# Patient Record
Sex: FEMALE | Race: WHITE
[De-identification: names, ages, dates, MRNs, and addresses within clinical notes are randomized per-mention and may not be internally consistent; named-entity substitution may affect disease eponyms.]

---

## 2019-10-13 ENCOUNTER — HOSPITAL ENCOUNTER (OUTPATIENT)
Dept: HOSPITAL 97 - ER | Age: 54
Setting detail: OBSERVATION
Discharge: HOME | End: 2019-10-13
Attending: SURGERY | Admitting: SURGERY
Payer: COMMERCIAL

## 2019-10-13 VITALS — TEMPERATURE: 98.2 F | SYSTOLIC BLOOD PRESSURE: 107 MMHG | DIASTOLIC BLOOD PRESSURE: 55 MMHG

## 2019-10-13 VITALS — OXYGEN SATURATION: 100 %

## 2019-10-13 DIAGNOSIS — K35.890: Primary | ICD-10-CM

## 2019-10-13 DIAGNOSIS — E66.01: ICD-10-CM

## 2019-10-13 LAB
ALBUMIN SERPL BCP-MCNC: 3.8 G/DL (ref 3.4–5)
ALP SERPL-CCNC: 115 U/L (ref 45–117)
ALT SERPL W P-5'-P-CCNC: 39 U/L (ref 12–78)
AST SERPL W P-5'-P-CCNC: 29 U/L (ref 15–37)
BUN BLD-MCNC: 24 MG/DL (ref 7–18)
GLUCOSE SERPLBLD-MCNC: 131 MG/DL (ref 74–106)
HCT VFR BLD CALC: 37.9 % (ref 36–45)
INR BLD: 0.95
LIPASE SERPL-CCNC: 79 U/L (ref 73–393)
LYMPHOCYTES # SPEC AUTO: 1.4 K/UL (ref 0.7–4.9)
MAGNESIUM SERPL-MCNC: 2 MG/DL (ref 1.8–2.4)
NT-PROBNP SERPL-MCNC: 30 PG/ML (ref ?–125)
PMV BLD: 8 FL (ref 7.6–11.3)
POTASSIUM SERPL-SCNC: 4.2 MMOL/L (ref 3.5–5.1)
RBC # BLD: 4.09 M/UL (ref 3.86–4.86)
TROPONIN (EMERG DEPT USE ONLY): < 0.02 NG/ML (ref 0–0.04)
UA COMPLETE W REFLEX CULTURE PNL UR: (no result)

## 2019-10-13 PROCEDURE — 36415 COLL VENOUS BLD VENIPUNCTURE: CPT

## 2019-10-13 PROCEDURE — 88304 TISSUE EXAM BY PATHOLOGIST: CPT

## 2019-10-13 PROCEDURE — 80320 DRUG SCREEN QUANTALCOHOLS: CPT

## 2019-10-13 PROCEDURE — 71045 X-RAY EXAM CHEST 1 VIEW: CPT

## 2019-10-13 PROCEDURE — 83880 ASSAY OF NATRIURETIC PEPTIDE: CPT

## 2019-10-13 PROCEDURE — 96365 THER/PROPH/DIAG IV INF INIT: CPT

## 2019-10-13 PROCEDURE — 96361 HYDRATE IV INFUSION ADD-ON: CPT

## 2019-10-13 PROCEDURE — 80048 BASIC METABOLIC PNL TOTAL CA: CPT

## 2019-10-13 PROCEDURE — 81015 MICROSCOPIC EXAM OF URINE: CPT

## 2019-10-13 PROCEDURE — 80076 HEPATIC FUNCTION PANEL: CPT

## 2019-10-13 PROCEDURE — 0DTJ4ZZ RESECTION OF APPENDIX, PERCUTANEOUS ENDOSCOPIC APPROACH: ICD-10-PCS

## 2019-10-13 PROCEDURE — 93005 ELECTROCARDIOGRAM TRACING: CPT

## 2019-10-13 PROCEDURE — 87086 URINE CULTURE/COLONY COUNT: CPT

## 2019-10-13 PROCEDURE — 74177 CT ABD & PELVIS W/CONTRAST: CPT

## 2019-10-13 PROCEDURE — 85025 COMPLETE CBC W/AUTO DIFF WBC: CPT

## 2019-10-13 PROCEDURE — 87088 URINE BACTERIA CULTURE: CPT

## 2019-10-13 PROCEDURE — 81025 URINE PREGNANCY TEST: CPT

## 2019-10-13 PROCEDURE — 81003 URINALYSIS AUTO W/O SCOPE: CPT

## 2019-10-13 PROCEDURE — 83735 ASSAY OF MAGNESIUM: CPT

## 2019-10-13 PROCEDURE — 85610 PROTHROMBIN TIME: CPT

## 2019-10-13 PROCEDURE — 96375 TX/PRO/DX INJ NEW DRUG ADDON: CPT

## 2019-10-13 PROCEDURE — 83690 ASSAY OF LIPASE: CPT

## 2019-10-13 PROCEDURE — 99285 EMERGENCY DEPT VISIT HI MDM: CPT

## 2019-10-13 PROCEDURE — 84484 ASSAY OF TROPONIN QUANT: CPT

## 2019-10-13 NOTE — EDPHYS
Physician Documentation                                                                           

 [facility]                                                                 

Name: Chata Noriega                                                                                 

Age: 54 yrs                                                                                       

Sex: Female                                                                                       

: 1965                                                                                   

MRN: [id number]                                                                                   

Arrival Date: 10/13/2019                                                                          

Time: 08:32                                                                                       

Account#: [id number]                                                                            

Bed 15                                                                                            

Private MD: Unknown, Unknown                                                                      

ED Physician Armaan Duval                                                                       

HPI:                                                                                              

10/13                                                                                             

09:22 This 54 yrs old  Female presents to ER via Ambulatory with complaints of Chest kdr 

      Pain, Abdominal Pain, Back Pain.                                                            

09:22 The patient or guardian reports chest pain that is located primarily in the epigastric  kdr 

      area, anterior chest wall, left, chest diffusely. Onset: suddenly, this morning, at         

      03:00. The pain radiates to the left scapula, back. Associated signs and symptoms:          

      Pertinent positives: abdominal pain, nausea, Pertinent negatives: recent travel,            

      shortness of breath, syncope, vomiting. The chest pain is described as aching, burning,     

      causing indigestion, a pressure. Duration: The patient or guardian reports a single         

      episode, that is still ongoing, but improving. Modifying factors: The symptoms are          

      alleviated by nothing. the symptoms are aggravated by nothing. Severity of pain: At its     

      worst the pain was moderate severe just prior to arrival, in the emergency department       

      the pain is unchanged. The patient has not experienced similar symptoms in the past.        

      The patient has not recently seen a physician. States that last evening in the hour or      

      so prior to midnight she had several alcoholic beverages and then awoke at 03:00 with       

      severe abdominal and chest pain.                                                            

                                                                                                  

OB/GYN:                                                                                           

08:56 LMP 2019                                                                              jl7 

                                                                                                  

Historical:                                                                                       

- Allergies:                                                                                      

08:56 No Known Allergies;                                                                     jl7 

- Home Meds:                                                                                      

08:56 None [Active];                                                                          jl7 

- PMHx:                                                                                           

08:56 None;                                                                                   jl7 

- PSHx:                                                                                           

08:56 Tubal ligation;                                                                         jl7 

                                                                                                  

- Immunization history:: Adult Immunizations unknown.                                             

- Social history:: Smoking status: Patient/guardian denies using tobacco, Patient uses            

  alcohol, occasionally.                                                                          

- Ebola Screening: : No symptoms or risks identified at this time.                                

                                                                                                  

                                                                                                  

ROS:                                                                                              

09:22 Constitutional: Negative for fever, chills, and weight loss, Eyes: Negative for injury, kdr 

      pain, redness, and discharge, ENT: Negative for injury, pain, and discharge, Neck:          

      Negative for injury, pain, and swelling, Respiratory: Negative for shortness of breath,     

      cough, wheezing, and pleuritic chest pain, Back: Negative for injury and pain, :          

      Negative for injury, bleeding, discharge, and swelling, MS/Extremity: Negative for          

      injury and deformity, Skin: Negative for injury, rash, and discoloration, Neuro:            

      Negative for headache, weakness, numbness, tingling, and seizure activity. Psych:           

      Negative for depression, anxiety, suicide ideation, homicidal ideation, and                 

      hallucinations, Allergy/Immunology: Negative for hives, rash, and allergies, Endocrine:     

      Negative for neck swelling, polydipsia, polyuria, polyphagia, and marked weight             

      changes, Hematologic/Lymphatic: Negative for swollen nodes, abnormal bleeding, and          

      unusual bruising.                                                                           

09:22 Cardiovascular: Positive for chest pain, of the xyphoid area, mid-sternal area, right       

      breast and left breast, Negative for edema, orthopnea, palpitations, paroxysmal             

      nocturnal dyspnea.                                                                          

09:22 Abdomen/GI: Positive for abdominal pain, abdominal cramps, Negative for abdominal           

      distension, dysphagia, hematemesis, black/tarry stool, rectal pain, rectal bleeding,        

      bowel incontinence, flatulence.                                                             

                                                                                                  

Exam:                                                                                             

09:22 Constitutional:  This is a well developed, well nourished patient who is awake, alert,  kdr 

      and in no acute distress. Head/Face:  Normocephalic, atraumatic. Eyes:  Pupils equal        

      round and reactive to light, extra-ocular motions intact.  Lids and lashes normal.          

      Conjunctiva and sclera are non-icteric and not injected.  Cornea within normal limits.      

      Periorbital areas with no swelling, redness, or edema. Neck:  Trachea midline, no           

      thyromegaly or masses palpated, and no cervical lymphadenopathy.  Supple, full range of     

      motion without nuchal rigidity, or vertebral point tenderness.  No Meningismus.             

      Chest/axilla:  Normal chest wall appearance and motion.  Nontender with no deformity.       

      No lesions are appreciated. Cardiovascular:  Regular rate and rhythm with a normal S1       

      and S2.  No gallops, murmurs, or rubs.  Normal PMI, no JVD.  No pulse deficits.             

      Respiratory:  Lungs have equal breath sounds bilaterally, clear to auscultation and         

      percussion.  No rales, rhonchi or wheezes noted.  No increased work of breathing, no        

      retractions or nasal flaring. Back:  No spinal tenderness.  No costovertebral               

      tenderness.  Full range of motion. Skin:  Warm, dry with normal turgor.  Normal color       

      with no rashes, no lesions, and no evidence of cellulitis. MS/ Extremity:  Pulses           

      equal, no cyanosis.  Neurovascular intact.  Full, normal range of motion. Neuro:  Awake     

      and alert, GCS 15, oriented to person, place, time, and situation.  Cranial nerves          

      II-XII grossly intact.  Motor strength 5/5 in all extremities.  Sensory grossly intact.     

       Cerebellar exam normal.  Normal gait. Psych:  Awake, alert, with orientation to            

      person, place and time.  Behavior, mood, and affect are within normal limits.               

09:22 Abdomen/GI: Inspection: abdomen appears normal, Bowel sounds: active, diminished, in        

      all quadrants, Palpation: soft, mild abdominal tenderness, in the abdomen diffusely,        

      mass, is not appreciated, rebound tenderness, is not appreciated.                           

09:22 ECG was reviewed by the Attending Physician.                                            kdr 

                                                                                                  

Vital Signs:                                                                                      

08:56  / 97; Pulse 91; Resp 16 S; Temp 98.3(O); Pulse Ox 100% on R/A; Weight 99.79 kg   jl7 

      (R); Height 5 ft. 2 in. (157.48 cm) (R); Pain 9/10;                                         

09:30  / 58; Pulse 83; Resp 16 S; Pulse Ox 100% on R/A; Pain 9/10;                      jl7 

10:45  / 65; Pulse 81; Resp 16 S; Pulse Ox 100% on R/A;                                 jl7 

13:00  / 76; Pulse 77; Resp 16 S; Pulse Ox 100% on R/A;                                 jl7 

08:56 Body Mass Index 40.24 (99.79 kg, 157.48 cm)                                             jl7 

                                                                                                  

MDM:                                                                                              

10:43 Patient medically screened.                                                             kdr 

10:43 Data reviewed: vital signs, nurses notes, lab test result(s), radiologic studies.       kdr 

      Counseling: I had a detailed discussion with the patient and/or guardian regarding: the     

      historical points, exam findings, and any diagnostic results supporting the                 

      discharge/admit diagnosis, lab results, radiology results, the need for further work-up     

      and treatment in the hospital. Physician consultation: Jamison Covarrubias MD regarding             

      admission, patient's condition, need to evaluate the patient as soon as possible.           

                                                                                                  

10/13                                                                                             

08:46 Order name: Urine Culture                                                               eb  

10/13                                                                                             

08:46 Order name: Urine Microscopic Only; Complete Time: 10:16                                eb  

10/13                                                                                             

08:47 Order name: Urine Culture                                                               EDMS

10/13                                                                                             

08:47 Order name: Urine Dipstick--Ancillary (enter results); Complete Time: 10:16             eb  

10/13                                                                                             

09:04 Order name: Basic Metabolic Panel                                                       kdr 

10/13                                                                                             

09:04 Order name: CBC with Diff                                                               kdr 

10/13                                                                                             

09:04 Order name: LFT's                                                                       kdr 

10/13                                                                                             

09:04 Order name: Magnesium                                                                   kdr 

10/13                                                                                             

09:04 Order name: NT PRO-BNP; Complete Time: 10:16                                            kdr 

10/13                                                                                             

09:04 Order name: PT-INR; Complete Time: 10:16                                                kdr 

10/13                                                                                             

09:04 Order name: Troponin (emerg Dept Use Only); Complete Time: 10:16                        kdr 

10/13                                                                                             

09:04 Order name: Creatinine for Radiology; Complete Time: 10:16                              kdr 

10/13                                                                                             

09:04 Order name: Lipase; Complete Time: 10:16                                                kdr 

10/13                                                                                             

09:04 Order name: ETOH Level; Complete Time: 10:16                                            [facility] 

10/13                                                                                             

09:04 Order name: XRAY Chest (1 view); Complete Time: 16:42                                   kdr 

10/13                                                                                             

09:04 Order name: Basic Metabolic Panel; Complete Time: 10:16                                 EDMS

10/13                                                                                             

09:04 Order name: CBC with Automated Diff; Complete Time: 10:16                               EDMS

10/13                                                                                             

09:04 Order name: Liver (Hepatic) Function; Complete Time: 10:16                              EDMS

10/13                                                                                             

09:04 Order name: Magnesium; Complete Time: 10:16                                             EDMS

10/13                                                                                             

09:11 Order name: CT Abd/Pelvis - IV Contrast Only; Complete Time: 16:42                      kdr 

10/13                                                                                             

12:16 Order name: Urine Pregnancy--Ancillary (enter results); Complete Time: 16:42            iw  

10/13                                                                                             

13:40 Order name: Basic Metabolic Panel                                                       EDMS

10/13                                                                                             

13:40 Order name: Basic Metabolic Panel                                                       EDMS

10/13                                                                                             

13:40 Order name: CBC with Automated Diff                                                     EDMS

10/13                                                                                             

13:40 Order name: CBC with Automated Diff                                                     EDMS

10/13                                                                                             

13:40 Order name: Lipase                                                                      EDMS

10/13                                                                                             

13:40 Order name: Lipase                                                                      EDMS

10/13                                                                                             

13:40 Order name: Liver (Hepatic) Function                                                    EDMS

10/13                                                                                             

13:40 Order name: Liver (Hepatic) Function                                                    EDMS

10/13                                                                                             

09:04 Order name: Cardiac monitoring; Complete Time: :                                    kdr 

10/13                                                                                             

09:04 Order name: EKG - Nurse/Tech; Complete Time: :                                      kdr 

10/13                                                                                             

09:04 Order name: IV Saline Lock; Complete Time: :                                        kdr 

10/13                                                                                             

09:04 Order name: Labs collected and sent; Complete Time: :                               kdr 

10/13                                                                                             

09:04 Order name: O2 Per Protocol; Complete Time: :                                       kdr 

10/13                                                                                             

09:04 Order name: O2 Sat Monitoring; Complete Time: :                                     kdr 

10/13                                                                                             

13:40 Order name: NPO                                                                         EDMS

                                                                                                  

EC: Rate is 82 beats/min. Rhythm is regular, Normal Sinus Rhythm with No ectopy. QRS Axis   kdr 

      is Normal. OH interval is normal. QRS interval is normal. QT interval is normal. No Q       

      waves. T waves are Normal. No ST changes noted. Clinical impression: Normal ECG and NSR     

      w/ Non-specific ST/T Changes.                                                               

                                                                                                  

Administered Medications:                                                                         

09:12 Drug: NS 0.9% 1000 ml Route: IV; Rate: 1 bolus; Site: right antecubital;                jl7 

10:30 Follow up: Response: No adverse reaction; IV Status: Completed infusion; IV Intake:     jl7 

      1000ml                                                                                      

09:13 Drug: Pepcid 20 mg Route: IVP; Site: right antecubital;                                 jl7 

09:59 Follow up: Response: No adverse reaction; Marked relief of symptoms                     jl7 

09:14 Drug: Zofran 4 mg Route: IVP; Site: right antecubital;                                  jl7 

09:45 Follow up: Response: No adverse reaction                                                jl7 

09:16 Drug: morphine 2 mg Route: IVP; Site: right antecubital;                                jl7 

09:45 Follow up: Response: No adverse reaction; Pain is decreased                             jl7 

09:55 Drug: morphine 4 mg Route: IVP; Site: right antecubital;                                jl7 

10:10 Follow up: Response: No adverse reaction; Pain is unchanged, physician notified         jl7 

10:32 Drug: Rocephin 1 grams Route: IV; Rate: calculated rate; Site: right antecubital;       jl7 

10:35 Follow up: Response: No adverse reaction; IV Status: Completed infusion                 jl7 

10:43 Drug: Zosyn 4.5 grams Route: IVPB; Infused Over: 60 mins; Site: right antecubital;      jl7 

11:43 Follow up: Response: No adverse reaction; IV Status: Completed infusion                 jl7 

11:46 Drug: fentaNYL (PF) 50 mcg Route: IVP; Site: right antecubital;                         jl7 

12:00 Follow up: Response: No adverse reaction; Pain is decreased                             jl7 

                                                                                                  

                                                                                                  

Disposition:                                                                                      

10/13/19 13:31 Hospitalization ordered by Jamison Covarrubias for Observation. Preliminary diagnosis     

  is Acute appendicitis.                                                                          

- Bed requested for Operating Room.                                                               

- Status is Observation.                                                                      jl7 

- Condition is Fair.                                                                              

- Problem is new.                                                                                 

- Symptoms are unchanged.                                                                         

UTI on Admission? No                                                                              

                                                                                                  

                                                                                                  

                                                                                                  

Signatures:                                                                                       

Dispatcher [employer]                           EDMS                                                 

Armaan Duval MD MD   kdr                                                  

Vinnie Marx RN                        RN   jl7                                                  

                                                                                                  

Corrections: (The following items were deleted from the chart)                                    

10:52 10:43 Hospitalization Ordered by Jamison Covarrubias MD for Observation. Preliminary diagnosis  kdr 

      is Acute appendicitis; Chronic right hydronephrosis. Bed requested for                      

      Telemetry/MedSurg (observation). Status is Observation. Condition is Fair. Problem is       

      new. Symptoms are unchanged. UTI on Admission? No. kdr                                      

12:26 10:52 10/13/2019 10:43 Hospitalization Ordered by Jamison Covarrubias MD for Observation.       kdr 

      Preliminary diagnosis is Acute appendicitis; Chronic right hydronephrosis. Bed              

      requested for Operating Room. Status is Observation. Condition is Fair. Problem is new.     

      Symptoms are unchanged. UTI on Admission? No. kdr                                           

13:30 12:27 10/13/2019 12:27 Transfer ordered to [facility]. Diagnosis is kdr 

      Acute appendicitis. Reason for transfer: Higher level of care. Accepting physician is       

      Dr. Castillo ( Service). Condition is Fair. Problem is new. Symptoms have improved. kdr      

13:55 13:31 Hospitalization Ordered by Jamison Covarrubias MD for Observation. Preliminary diagnosis  jl7 

      is Acute appendicitis. Bed requested for Operating Room. Status is Observation.             

      Condition is Fair. Problem is new. Symptoms are unchanged. UTI on Admission? No. kdr        

                                                                                                  

**************************************************************************************************

## 2019-10-13 NOTE — RAD REPORT
EXAM DESCRIPTION:  RAD - Chest Single View - 10/13/2019 9:24 am

 

CLINICAL HISTORY:  Chest pain

 

COMPARISON:  None.

 

TECHNIQUE:  AP portable chest image was obtained 0919 hours .

 

FINDINGS:  Lungs are clear. Heart and vasculature are normal. No measurable pleural effusion and no p
neumothorax. No acute bony abnormality seen. No acute aortic findings suspected.

 

IMPRESSION:  No acute cardiopulmonary process.

## 2019-10-13 NOTE — RAD REPORT
EXAM DESCRIPTION:  CT - Abdomen   Pelvis W Contrast - 10/13/2019 10:12 am

 

CLINICAL HISTORY:  ABD PAIN

 

COMPARISON:  None.

 

TECHNIQUE:  Biphasic, helical CT imaging of the abdomen and pelvis was performed following 100 ml non
-ionic IV contrast. No oral contrast given.

 

All CT scans are performed using dose optimization technique as appropriate and may include automated
 exposure control or mA/KV adjustment according to patient size.

 

FINDINGS:  No suspicious findings in the lung bases.

 

The liver, spleen, and pancreas show no suspicious findings. Gallbladder and biliary tree are also wi
thout suspicious finding.

 

Patient has severe hydronephrosis of the pelvis and calices of the right kidney without hydroureter. 
No dilatation of the left collecting system. Cortical thinning is noted on the right when compared to
 the left with a more lobulated contour to the kidney. Renal function is not clearly asymmetric. Nono
bstructing calculi seen in the upper pole of the right kidney. No pyelonephritis or acute parenchymal
 process. No bladder abnormalities. No adrenal abnormalities.

 

No gastric dilatation or gastric wall thickening. No small bowel abnormality. The appendix is abnorma
l at 13 mm. Periappendiceal inflammatory stranding is present. No fecalith seen. No mass is identifie
d. Remainder the colon is unremarkable. The  no free air or pneumatosis. Small quantity of fluid is p
resent in the cul de sac and right adnexa believed to be reactive fluid from the appendicitis.  No ma
ss or bulky lymphadenopathy. Small fat only umbilical hernia is present.

 

Tubal ligation clips are present. No left ovarian abnormality. A 2.5 centimeter low-density mass is p
resent right lower uterine segment region probably a small fibroid. This is potentially a large nabot
hian cyst. No primary right ovarian process suspected.

 

Disc and bony degenerative changes are present. No acute vascular finding.

 

 

IMPRESSION:  Acute appendicitis findings are present. The appendix is in classic right lower quadrant
 location.

 

Periappendiceal inflammatory stranding is present. No abscess, free air or other complicating factor.


 

Severe hydronephrosis of the pelvis and calices of the right kidney without clear delay or asymmetry 
of function. 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

 

Additional nonacute findings detailed in the body of the report.

## 2019-10-13 NOTE — ER
Nurse's Notes                                                                                     

 [facility]                                                                 

Name: Chata Noriega                                                                                 

Age: 54 yrs                                                                                       

Sex: Female                                                                                       

: 1965                                                                                   

MRN: [id number]                                                                                   

Arrival Date: 10/13/2019                                                                          

Time: 08:32                                                                                       

Account#: [id number]                                                                            

Bed 15                                                                                            

Private MD: Unknown, Unknown                                                                      

Diagnosis: Acute appendicitis                                                                     

                                                                                                  

Presentation:                                                                                     

10/13                                                                                             

08:53 Presenting complaint: Patient states: Had a couple drinks last night, home by 1230. At  jl7 

      about 0300 my stomach was burning, vomited once and then my chest started hurting,          

      radiating to left arm and back. It also feels like I need to pee when I stand up but I      

      don't. Transition of care: patient was not received from another setting of care. Onset     

      of symptoms was 2019 at 03:00. Risk Assessment: Do you want to hurt             

      yourself or someone else? Patient reports no desire to harm self or others. Initial         

      Sepsis Screen: Does the patient meet any 2 criteria? No. Patient's initial sepsis           

      screen is negative. Does the patient have a suspected source of infection? No.              

      Patient's initial sepsis screen is negative. Care prior to arrival: None.                   

08:53 Method Of Arrival: Ambulatory                                                           [facility] 

08:53 Acuity: CARIDAD 3                                                                           jl7 

                                                                                                  

OB/GYN:                                                                                           

08:56 LMP 2019                                                                              [facility] 

                                                                                                  

Historical:                                                                                       

- Allergies:                                                                                      

08:56 No Known Allergies;                                                                     jl7 

- Home Meds:                                                                                      

08:56 None [Active];                                                                          jl7 

- PMHx:                                                                                           

08:56 None;                                                                                   jl7 

- PSHx:                                                                                           

08:56 Tubal ligation;                                                                         jl7 

                                                                                                  

- Immunization history:: Adult Immunizations unknown.                                             

- Social history:: Smoking status: Patient/guardian denies using tobacco, Patient uses            

  alcohol, occasionally.                                                                          

- Ebola Screening: : No symptoms or risks identified at this time.                                

                                                                                                  

                                                                                                  

Screenin:10 Abuse screen: Denies threats or abuse. Nutritional screening: No deficits noted.        em  

      Tuberculosis screening: No symptoms or risk factors identified. Fall Risk None              

      identified.                                                                                 

                                                                                                  

Assessment:                                                                                       

08:35 General: Appears in no apparent distress. uncomfortable, Behavior is calm, cooperative, jl7 

      appropriate for age. Pain: Complains of pain in anterior aspect of left upper chest         

      Pain radiates to back and left arm Pain currently is 9 out of 10 on a pain scale.           

      Quality of pain is described as squeezing, Pain began 0300 Is continuous. Neuro: Level      

      of Consciousness is awake, alert, obeys commands, Oriented to person, place, time,          

      situation. Cardiovascular: Patient's skin is warm and dry. Rhythm is sinus rhythm.          

      Respiratory: Airway is patent Respiratory effort is even, unlabored, Respiratory            

      pattern is regular, symmetrical. GI: Abdomen is round non-distended, Reports epigastric     

      pain, nausea, vomiting. : Reports urinary frequency. Derm: Skin is pink, warm \T\ dry.    

09:45 Reassessment: Pt reports decreased chest pain and decreased epigastric burning. Reports jl7 

      RLQ pain, feels like pressure, rated 9/10, pt guarding, reports last BM 2 days ago. ERP     

      notified, see MAR for orders.                                                               

                                                                                                  

Vital Signs:                                                                                      

08:56  / 97; Pulse 91; Resp 16 S; Temp 98.3(O); Pulse Ox 100% on R/A; Weight 99.79 kg   jl7 

      (R); Height 5 ft. 2 in. (157.48 cm) (R); Pain 9/10;                                         

09:30  / 58; Pulse 83; Resp 16 S; Pulse Ox 100% on R/A; Pain 9/10;                      jl7 

10:45  / 65; Pulse 81; Resp 16 S; Pulse Ox 100% on R/A;                                 jl7 

13:00  / 76; Pulse 77; Resp 16 S; Pulse Ox 100% on R/A;                                 jl7 

08:56 Body Mass Index 40.24 (99.79 kg, 157.48 cm)                                             jl7 

                                                                                                  

ED Course:                                                                                        

08:32 Patient arrived in ED.                                                                  ag5 

08:33 Unknown, Unknown is Private Physician.                                                  ag5 

08:33 Vinnie Marx RN is Primary Nurse.                                                      jl7 

08:35 Armaan Duval MD is Attending Physician.                                              kdr 

08:55 Triage completed.                                                                       jl7 

08:56 Arm band placed on right wrist.                                                         jl7 

09:07 Urine collected: clean catch specimen, cloudy.                                          ms  

09:10 Patient has correct armband on for positive identification. Placed in gown. Bed in low  em  

      position. Call light in reach. Side rails up X2. Cardiac monitor on. Pulse ox on. NIBP      

      on.                                                                                         

09:10 Initial lab(s) drawn, by me, sent to lab. Inserted saline lock: 22 gauge in right       em  

      antecubital area, using aseptic technique. Blood collected.                                 

09:10 Patient maintains SpO2 saturation greater than 95% on room air.                         em  

09:18 Radiology exam delayed due to lab results not completed at this time. (BUN/Creatinine). kw1 

09:24 XRAY Chest (1 view) In Process Unspecified.                                             EDMS

10:11 CT Abd/Pelvis - IV Contrast Only In Process Unspecified.                                EDMS

10:16 CT completed. Patient tolerated procedure well. Patient moved back from CT.             ka  

10:41 Jamison Covarrubias MD is Hospitalizing Provider.                                             kdr 

12:06 initiated a transfer with Jennifer at the [facility] transfer center.                       eb  

12:15 connected Dr. Devi the hospitalist on call for [facility] with Dr. Duval for      

      patient transfer consultation.                                                              

13:31 Jamison Covarrubias MD is Hospitalizing Provider.                                             kdr 

13:54 No provider procedures requiring assistance completed. Patient admitted, IV remains in  jl7 

      place. intact, No redness/swelling at site.                                                 

                                                                                                  

Administered Medications:                                                                         

09:12 Drug: NS 0.9% 1000 ml Route: IV; Rate: 1 bolus; Site: right antecubital;                jl7 

10:30 Follow up: Response: No adverse reaction; IV Status: Completed infusion; IV Intake:     jl7 

      1000ml                                                                                      

09:13 Drug: Pepcid 20 mg Route: IVP; Site: right antecubital;                                 jl7 

09:59 Follow up: Response: No adverse reaction; Marked relief of symptoms                     jl7 

09:14 Drug: Zofran 4 mg Route: IVP; Site: right antecubital;                                  jl7 

09:45 Follow up: Response: No adverse reaction                                                jl7 

09:16 Drug: morphine 2 mg Route: IVP; Site: right antecubital;                                jl7 

09:45 Follow up: Response: No adverse reaction; Pain is decreased                             jl7 

09:55 Drug: morphine 4 mg Route: IVP; Site: right antecubital;                                jl7 

10:10 Follow up: Response: No adverse reaction; Pain is unchanged, physician notified         jl7 

10:32 Drug: Rocephin 1 grams Route: IV; Rate: calculated rate; Site: right antecubital;       jl7 

10:35 Follow up: Response: No adverse reaction; IV Status: Completed infusion                 jl7 

10:43 Drug: Zosyn 4.5 grams Route: IVPB; Infused Over: 60 mins; Site: right antecubital;      jl7 

11:43 Follow up: Response: No adverse reaction; IV Status: Completed infusion                 jl7 

11:46 Drug: fentaNYL (PF) 50 mcg Route: IVP; Site: right antecubital;                         jl7 

12:00 Follow up: Response: No adverse reaction; Pain is decreased                             jl7 

                                                                                                  

                                                                                                  

Intake:                                                                                           

10:30 IV: 1000ml; Total: 1000ml.                                                              jl7 

                                                                                                  

Outcome:                                                                                          

10:43 Decision to Hospitalize by Provider.                                                    kdr 

12:27 ER care complete, transfer ordered by MD.                                               kdr 

13:31 Decision to Hospitalize by Provider.                                                    kdr 

13:54 Admitted to OR accompanied by nurse, via stretcher, with chart.                         jl7 

13:54 Condition: stable                                                                           

13:54 Discharge instructions given to patient, Instructed on the need for admit, Demonstrated     

      understanding of instructions.                                                              

13:55 Patient left the ED.                                                                    jl7 

                                                                                                  

Signatures:                                                                                       

Dispatcher [employer]                           EDMS                                                 

Armaan Duval MD MD   [facility]                                                  

Sudheer Lewis, ROMÁNN                       LVN  Yolanda Del Rosario ms, Vinnie Almeida, RN                        RN   jl7                                                  

Carlie Powell1                                                  

Malia Candelario, Joan                                ag5                                                  

                                                                                                  

Corrections: (The following items were deleted from the chart)                                    

 09:35 General: Appears in no apparent distress. uncomfortable, Behavior is calm,        jl7 

      cooperative, appropriate for age, jl7                                                       

 09:35 Pain: Complains of pain in anterior aspect of left upper chest Pain radiates to   jl7 

      back and left arm Pain currently is 9 out of 10 on a pain scale. Quality of pain is         

      described as squeezing, Pain began 0300 Is continuous, jl7                                  

 09:35 Neuro: Level of Consciousness is awake, alert, obeys commands, Oriented to        jl7 

      person, place, time, situation, jl7                                                         

 09:35 Cardiovascular: Patient's skin is warm and dry. Rhythm is sinus rhythm jl7        jl7 

 09:35 Respiratory: Airway is patent Respiratory effort is even, unlabored, Respiratory  jl7 

      pattern is regular, symmetrical, jl7                                                        

 09:35 GI: Abdomen is round non-distended, Reports epigastric pain, nausea, vomiting, [id number] 

09:57 09:35 : Reports urinary frequency, jl7                                                jl7 

09:57 09:35 Derm: Skin is pink, warm \T\ dry. jl7                                               jl7

                                                                                                  

**************************************************************************************************

## 2019-10-13 NOTE — OP
Date of Procedure:  10/13/2019



Surgeon:  Louis Covarrubias MD, MD



Preoperative Diagnosis:  Acute nonperforated appendicitis.



Postoperative Diagnosis:  Acute nonperforated appendicitis.



Procedure Performed:  Laparoscopic appendectomy.



Anesthesia:  General endotracheal plus local.



Estimated Blood Loss:  Less than 10 mL.



Specimen:  Vermiform appendix.



Findings:  Acute nonperforated appendicitis.



Complications:  None.



Disposition:  Transferred to recovery room in good condition.



Procedure In Detail:  After informed consent was obtained, the patient was brought to the operating r
oom, prepped and draped in the usual sterile fashion.  After adequate anesthesia was achieved, an inf
raumbilical area was anesthetized with 0.5% Marcaine, sharply incised and a 5-mm trocar was introduce
d in the abdomen without evidence of complication.  Insufflation was obtained to 15 mmHg.  At this ti
me, there was no injury to vital structures upon entry into the abdomen.  Additional trocar site was 
chosen in the right lower quadrant.  This was similarly anesthetized and sharply incised.  A 5-mm tro
car was introduced in the abdomen without evidence of complication.  The umbilical trocar was then up
-sized to 12 mm under visualization without evidence of complication.  Additional trocar site was cho
sen in the left lower quadrant.  This was similarly anesthetized, sharply incised, and a 5-mm trocar 
was introduced into the abdomen without evidence of complication.  Patient was positioned head-down r
ight side up position.  I used a ratcheted grasper and grasped the patient's appendix.  It was firmly
 adherent to the lateral abdominal wall.  I bluntly dissected this off and brought it down to the con
fluence of the cecum.  I created a mesoappendiceal window using a Pennie retractor.  Endo JUAN CARLOS 35 carol ann
e load was fired across the base of the appendix with good approximation of tissues.  There was no bl
eeding from the staple line.  The LigaSure device was then used to take the mesoappendix down without
 evidence of complication.  The appendix then placed in EndoCatch bag and removed the umbilical troca
r.  Insufflation was obtained and the appendix passed off for pathologic examination.  The area was c
opiously irrigated multiple times until completely clear and then suctioned out. Patient was position
ed in neutral position.  Suction and irrigation occurred 1 last time and then the irrigant was then s
uctioned out as much as possible.  The umbilical trocar was then removed.  The umbilical trocar site 
was then closed using a Say-Jim suture passer with 3 interrupted 0 Vicryl sutures in interrup
louis fashion with good approximation of tissues.  The abdomen was then completely desufflated under di
rect visualization without evidence of complication.  All skin incisions were copiously irrigated and
 closed with a 4-0 Monocryl in a running fashion.  Dermabond was placed over the top.  Patient tolera
louis the procedure well without evidence of complication and transferred in good condition.  All count
s were correct at the end of the case.





DYAN/SAIMA

DD:  10/13/2019 15:08:55Voice ID:  [id number]

DT:  10/13/2019 22:22:07Report ID:  [id number]

## 2019-10-13 NOTE — P.OP
Preoperative diagnosis: Acute Non-Perforated Appendicitis


Postoperative diagnosis: Acute Non-Perforated Appendicitis


Primary procedure: Laparoscopic Appendectomy


Anesthesia: GETA + Local


Estimated blood loss: <10cc


Specimen: Vermiform Appendix


Findings: Acute Non-Perforated Appendicitis


Complications: None


Transferred to: Recovery Room


Condition: Good

## 2019-10-13 NOTE — HP
Date of Admission:  10/13/2019



Brief History Of Present Illness:  Patient is a 54-year-old  female who comes to the hospital
 with approximately 1-day history of epigastric abdominal pain now radiating down to the right lower 
quadrant.  She states it was sharp, stabbing, associated with some mild nausea.  No vomiting.  Some s
ubjective fever and chills.  She has never had similar episodes before in the past.  No change in bow
el or bladder habits.  No sick contacts.  No recent travel.



Past Medical History:  Chronic bronchitis.



Past Surgical History:  She has had a tubal ligation and right hand surgery.



Allergies:  NO KNOWN DRUG ALLERGIES.



Medications:  None.



Social History:  She denies smoking.  She does drink alcohol recreationally.  She denies any other re
creational drug use.  She is currently unemployed.



Review of Systems:

10-point review of systems other than in HPI denies.



Physical Examination:

General:  At the time of my examination, she is awake, alert, oriented. 

Psychiatric:  She is appropriate conversive. 

HEENT:  She is normocephalic.  Sclerae icteric.  Mucous membranes are moist.  Oropharynx clear. 

Neck:  Supple.  No JVD. 

Chest:  Normal expansion and excursion. 

Cardiovascular:  Regular rate and rhythm. 

Pulmonary:  Clear to auscultation bilaterally. 

Abdomen:  Soft with positive right lower quadrant tenderness to palpation, positive at McBurney's poi
nt.  There is mild focal peritonitis in this area. 

Extremities:  No clubbing, cyanosis, or edema.  She has missing middle distal digit on the right hand
.



Laboratory Data:  She had a laboratory exam which revealed a white blood count 14.2, hemoglobin 12.8,
 hematocrit of 37.9, platelet count is 469, neutrophils were 83%.  PT is 11.2, INR 0.95.  Sodium 136,
 potassium 4.2, chloride 106, carbon dioxide 25, BUN 24, creatinine 1.04, glucose is 131, magnesium 2
.0, total bilirubin 0.3, direct component is 0.1, AST is 29, ALT 39, alkaline phosphatase 115, lipase
 is 79.  UA showed 1+ blood, 3+ leukocyte esterase, 5-10 squamous cells, white blood cells 20/50.  Ur
ine pregnancy test was negative.  Plasma alcohol level is less than 3. 



She had imaging performed as well which included a CT scan of the abdomen and pelvis, which was offic
ially read as acute appendicitis.  Findings are present.  The appendix is in the classic right lower 
quadrant location.  Tubal ligation clips were present.  Severe hydronephrosis of the pelvis, calluses
 of the right kidney without clear delay or asymmetric in function.  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 
congenital UPJ obstruction.



Assessment And Plan:  This is a 54-year-old female who comes in with classic signs of acute appendici
tis.

1.Intravenous fluid hydration.

2.Antibiotic coverage.

3.I have explained risks, benefits, and alternatives of laparoscopic, possible open appendectomy inc
luding, but not limited to bleeding, infection, damage to surrounding tissues, need for further opera
tive procedures.  Patient agrees to proceed as indicated.





DYAN/SAIMA

DD:  10/13/2019 12:31:49Voice ID:  [id number]

## 2019-10-14 NOTE — EKG
Test Date:    2019-10-13               Test Time:    08:47:40

Technician:   DARSHAN                                    

                                                     

MEASUREMENT RESULTS:                                       

Intervals:                                           

Rate:         82                                     

NE:           170                                    

QRSD:         82                                     

QT:           386                                    

QTc:          450                                    

Axis:                                                

P:            48                                     

NE:           170                                    

QRS:          62                                     

T:            27                                     

                                                     

INTERPRETIVE STATEMENTS:                                       

                                                     

Normal sinus rhythm

Normal ECG

No previous ECG available for comparison



Electronically Signed On 10-14-19 10:02:51 CDT by Pablo Jaeger